# Patient Record
Sex: FEMALE | Race: WHITE
[De-identification: names, ages, dates, MRNs, and addresses within clinical notes are randomized per-mention and may not be internally consistent; named-entity substitution may affect disease eponyms.]

---

## 2019-10-07 ENCOUNTER — HOSPITAL ENCOUNTER (EMERGENCY)
Dept: HOSPITAL 25 - UCEAST | Age: 24
Discharge: HOME | End: 2019-10-07
Payer: COMMERCIAL

## 2019-10-07 VITALS — DIASTOLIC BLOOD PRESSURE: 86 MMHG | SYSTOLIC BLOOD PRESSURE: 145 MMHG

## 2019-10-07 DIAGNOSIS — R11.0: ICD-10-CM

## 2019-10-07 DIAGNOSIS — R10.13: Primary | ICD-10-CM

## 2019-10-07 PROCEDURE — 84702 CHORIONIC GONADOTROPIN TEST: CPT

## 2019-10-07 PROCEDURE — G0463 HOSPITAL OUTPT CLINIC VISIT: HCPCS

## 2019-10-07 PROCEDURE — 81003 URINALYSIS AUTO W/O SCOPE: CPT

## 2019-10-07 PROCEDURE — 99202 OFFICE O/P NEW SF 15 MIN: CPT

## 2019-10-07 NOTE — UC
Abdominal Pain Female HPI





- HPI Summary


HPI Summary: 


Patient is a 25yo female presenting with abdominal "aching" x4 days. She states 

it is worse in the morning and comes with nausea. Says these are common 

symptoms that are present on the first day of her menstrual period which was on 

10/4/19. She says her cramps are never this strong and never last this long. 

She also notes the cramping is normally lower that the upper abdominal pain she 

is experiencing now. She rates the pain 5/10. Denies radiating pain. States 

pain has not worsened or improved. Notes decreased appetite which is also 

common for her during menstrual period. Denies decreased fluid intake. States 

she feels she has been "breathing more" but is not short of breath. Denies any 

aggravating or alleviating factors. Denies vomiting and diarrhea. Denies blood 

in stool or urine. Denies urinary burning or frequency. Denies any chance of 

pregnancy. Denies fever and chills. Denies ay history of GERD or other GI 

disorders. Denies daily medications. Notes she does get anxiety occasionally. 

Patient adds she is also worried she "may be anemic or have something wrong in 

her blood." Denies history of anemia.








- History of Current Complaint


Stated Complaint: ABDOMINAL PAIN


Hx Obtained From: Patient


Hx Last Menstrual Period: 10/4/19


Severity Currently: Moderate


Pain Intensity: 5


Pain Scale Used: 0-10 Numeric


Allergies/Adverse Reactions: 


 Allergies











Allergy/AdvReac Type Severity Reaction Status Date / Time


 


No Known Allergies Allergy   Verified 10/07/19 12:11














PMH/Surg Hx/FS Hx/Imm Hx


Previously Healthy: Yes





- Surgical History


Surgical History: None





- Family History


Known Family History: Positive: Unknown





- Social History


Alcohol Use: Rare


Substance Use Type: None


Smoking Status (MU): Never Smoked Tobacco





Review of Systems


All Other Systems Reviewed And Are Negative: Yes


Constitutional: Positive: Negative.  Negative: Fever, Chills, Fatigue


Skin: Positive: Negative


Eyes: Positive: Negative


ENT: Positive: Negative


Respiratory: Positive: Negative.  Negative: Shortness Of Breath, Cough


Cardiovascular: Positive: Negative.  Negative: Palpitations, Chest Pain


Gastrointestinal: Positive: Abdominal Pain, Nausea.  Negative: Vomiting, 

Diarrhea


Genitourinary: Positive: Negative


Motor: Positive: Negative


Musculoskeletal: Positive: Negative


Neurological: Positive: Negative


Psychological: Positive: Anxious





Physical Exam


Triage Information Reviewed: Yes


Appearance: Well-Appearing, No Pain Distress, Well-Nourished, Other: - Patient 

fidgeting in chair and avoiding eye contact


Vital Signs: 


 Initial Vital Signs











Temp  99.3 F   10/07/19 12:06


 


Pulse  103   10/07/19 12:06


 


Resp  18   10/07/19 12:06


 


BP  145/86   10/07/19 12:06


 


Pulse Ox  100   10/07/19 12:06








 Lab Results











  





  


 


  


 


  


 


  


 


  


 


  


 


  


 


  


 


  


 


  


 


  


 


  


 


  








 Lab Results











  10/07/19 10/07/19 Range/Units





  13:01 13:04 


 


POC Urine Color  Red A   


 


POC Urine Clarity  Cloudy   


 


POC Urine pH  6.5   (5-9)  


 


POC Ur Specif Gravity  1.010   (1.010-1.030)  


 


POC Urine Protein  1+ A   (Negative)  


 


POC Ur Glucose (UA)  Negative   (Negative)  


 


POC Urine Ketones  Negative   (Negative)  


 


POC Urine Blood  3+ A   (Negative)  


 


POC Urine Nitrite  Negative   (Negative)  


 


POC Urine Bilirubin  Negative   (Negative)  


 


POC Urine Urobilinogen  0.2   (Negative)  


 


POC U Leukocyte Esteras  Negative   (Negative)  


 


POC Ur Pregnancy Test   Negative  (Negative)  











Vital Signs Reviewed: Yes


Eyes: Positive: Conjunctiva Clear


ENT: Positive: Hearing grossly normal


Neck: Positive: Supple


Respiratory Exam: Normal


Respiratory: Positive: Lungs clear, Normal breath sounds, No respiratory 

distress, No accessory muscle use.  Negative: Crackles, Rhonchi, Stridor, 

Wheezing


Cardiovascular Exam: Normal


Cardiovascular: Positive: RRR, Tachycardia


Abdominal Exam: Normal


Abdomen Description: Positive: No Organomegaly, Soft.  Negative: Nontender - 

mild tenderness to palpation over umbilicus. negative psoas and rovsings, CVA 

Tenderness (R), CVA Tenderness (L), Distended, Guarding, Hepatomegaly, McBurney'

s Point Tenderness, Splenomegaly


Bowel Sounds: Positive: Present


Neurological: Positive: Alert


Psychological Exam: Other - Patient appears anxious





Abd Pain Female Course/Dx





- Course


Course Of Treatment: 


Discussed with patient that her symptoms are most likely caused by her 

menstrual cycle, anxiety, or a combination of the two. Her VS are normal and 

she is in no pain distress. UA was positive only for blood, which she states is 

menstrual blood. No urinary symptoms present and urine preg is negative. 

Patient given prescription for zofran for her nausea, as that what she states 

is bothering her the most. Patient concerned for something "wrong in her blood.

" When offered blood work, she refused today. Instructed her to follow up with 

the physician referral if she desires further work up or if symptoms persist. 

Instructed her to go to the ED if symptoms worsen. Patient voiced understanding 

and agreed to the treatment plan.








- Differential Dx/Diagnosis


Provider Diagnosis: 


 Nausea, Abdominal pain, acute, epigastric








Discharge ED





- Sign-Out/Discharge


Documenting (check all that apply): Patient Departure


All imaging exams completed and their final reports reviewed: No Studies





- Discharge Plan


Condition: Stable


Disposition: HOME


Prescriptions: 


Ondansetron ODT TAB* [Zofran 4 MG Odt TAB*] 4 mg PO Q6H PRN #12 tab.odt


 PRN Reason: Nausea


Patient Education Materials:  Acute Nausea and Vomiting (ED), Acute Abdominal 

Pain (ED)


Referrals: 


[facility] [Outside] - If Needed


[facility] PHYSICIAN REFERRAL [Outside] - If Needed


Additional Instructions: 


As discussed, take the zofran as prescribed for your nausea.


Get plenty of rest and fluids.


Eat a bland diet, such as bread, bananas, and rice while symptoms are present.


If your symptoms persist or you desire further workup, follow up with the [facility] or Physician Referral as listed below.


If you develop fever, excessive vomiting, nausea, diarrhea, or are unable to 

keep fluids down, go to the emergency department.








- Billing Disposition and Condition


Condition: STABLE


Disposition: Home

## 2019-10-08 ENCOUNTER — HOSPITAL ENCOUNTER (EMERGENCY)
Dept: HOSPITAL 25 - ED | Age: 24
Discharge: HOME | End: 2019-10-08
Payer: COMMERCIAL

## 2019-10-08 VITALS — SYSTOLIC BLOOD PRESSURE: 129 MMHG | DIASTOLIC BLOOD PRESSURE: 78 MMHG

## 2019-10-08 DIAGNOSIS — R10.13: Primary | ICD-10-CM

## 2019-10-08 LAB
ALBUMIN SERPL BCG-MCNC: 4.6 G/DL (ref 3.2–5.2)
ALBUMIN/GLOB SERPL: 2 {RATIO} (ref 1–3)
ALP SERPL-CCNC: 41 U/L (ref 34–104)
ALT SERPL W P-5'-P-CCNC: 13 U/L (ref 7–52)
ANION GAP SERPL CALC-SCNC: 6 MMOL/L (ref 2–11)
AST SERPL-CCNC: 19 U/L (ref 13–39)
BASOPHILS # BLD AUTO: 0 10^3/UL (ref 0–0.2)
BUN SERPL-MCNC: 5 MG/DL (ref 6–24)
BUN/CREAT SERPL: 7.1 (ref 8–20)
CALCIUM SERPL-MCNC: 9.4 MG/DL (ref 8.6–10.3)
CHLORIDE SERPL-SCNC: 108 MMOL/L (ref 101–111)
CK SERPL-CCNC: 98 U/L (ref 10–223)
EOSINOPHIL # BLD AUTO: 0 10^3/UL (ref 0–0.6)
GLOBULIN SER CALC-MCNC: 2.3 G/DL (ref 2–4)
GLUCOSE SERPL-MCNC: 124 MG/DL (ref 70–100)
HCG SERPL QL: < 0.6 MIU/ML
HCO3 SERPL-SCNC: 27 MMOL/L (ref 22–32)
HCT VFR BLD AUTO: 39 % (ref 35–47)
HGB BLD-MCNC: 13.5 G/DL (ref 12–16)
LYMPHOCYTES # BLD AUTO: 1.2 10^3/UL (ref 1–4.8)
MAGNESIUM SERPL-MCNC: 1.9 MG/DL (ref 1.9–2.7)
MCH RBC QN AUTO: 31 PG (ref 27–31)
MCHC RBC AUTO-ENTMCNC: 34 G/DL (ref 31–36)
MCV RBC AUTO: 89 FL (ref 80–97)
MONOCYTES # BLD AUTO: 0.2 10^3/UL (ref 0–0.8)
NEUTROPHILS # BLD AUTO: 3 10^3/UL (ref 1.5–7.7)
NRBC # BLD AUTO: 0 10^3/UL
NRBC BLD QL AUTO: 0
PLATELET # BLD AUTO: 180 10^3/UL (ref 150–450)
POTASSIUM SERPL-SCNC: 3.5 MMOL/L (ref 3.5–5)
PROT SERPL-MCNC: 6.9 G/DL (ref 6.4–8.9)
RBC # BLD AUTO: 4.4 10^6 /UL (ref 3.7–4.87)
RBC UR QL AUTO: (no result)
SODIUM SERPL-SCNC: 141 MMOL/L (ref 135–145)
WBC # BLD AUTO: 4.4 10^3/UL (ref 3.5–10.8)
WBC UR QL AUTO: (no result)

## 2019-10-08 PROCEDURE — 83605 ASSAY OF LACTIC ACID: CPT

## 2019-10-08 PROCEDURE — 81003 URINALYSIS AUTO W/O SCOPE: CPT

## 2019-10-08 PROCEDURE — 36415 COLL VENOUS BLD VENIPUNCTURE: CPT

## 2019-10-08 PROCEDURE — 87086 URINE CULTURE/COLONY COUNT: CPT

## 2019-10-08 PROCEDURE — 83690 ASSAY OF LIPASE: CPT

## 2019-10-08 PROCEDURE — 80053 COMPREHEN METABOLIC PANEL: CPT

## 2019-10-08 PROCEDURE — 96360 HYDRATION IV INFUSION INIT: CPT

## 2019-10-08 PROCEDURE — 83735 ASSAY OF MAGNESIUM: CPT

## 2019-10-08 PROCEDURE — 81015 MICROSCOPIC EXAM OF URINE: CPT

## 2019-10-08 PROCEDURE — 74019 RADEX ABDOMEN 2 VIEWS: CPT

## 2019-10-08 PROCEDURE — 99283 EMERGENCY DEPT VISIT LOW MDM: CPT

## 2019-10-08 PROCEDURE — 85025 COMPLETE CBC W/AUTO DIFF WBC: CPT

## 2019-10-08 PROCEDURE — 82550 ASSAY OF CK (CPK): CPT

## 2019-10-08 PROCEDURE — 84702 CHORIONIC GONADOTROPIN TEST: CPT

## 2019-10-08 PROCEDURE — 86140 C-REACTIVE PROTEIN: CPT

## 2019-10-08 NOTE — ED
Abdominal Pain/Female





- HPI Summary


HPI Summary: 


The patient is a 23 y/o F presenting to [facility] with a chief complaint of upper 

epigastric pain starting four days ago. She reports that since onset, the pain 

has been coming when she is sleeping, causing her to wake up, and then she is 

unable to fall back asleep as the pain continues. Throughout the day, the pain 

dissipates, but returns when she is about to go to sleep. The pain is described 

as a cramping and knotting sensation in the upper abdominal area. She 

additionally c/o nausea without vomiting and a decreased appetite. She denies 

any diarrhea or constipation. Currently, her symptoms are rated 7/10 in 

severity. There are no aggravating or alleviating factors. She has not taken 

any medications to treat the pain prior to arrival. LNMP: 10/04/19. PMHx: none. 

FHx: DM. Nonsmoker, rare EtOH, no substance use. Medications reviewed. 

Allergies noted.





- History of Current Complaint


Chief Complaint: EDAbdPain


Stated Complaint: ABD PAIN PER PT


Time Seen by Provider: 10/08/19 07:46


Hx Obtained From: Patient


Hx Last Menstrual Period: 10/4/19


Onset/Duration: Lasting Days - four, Still Present


Timing: Days


Severity Initially: Mild


Severity Currently: Moderate


Pain Intensity: 7


Pain Scale Used: 0-10 Numeric


Location: Epigastric


Radiates: No


Character: Cramping, Other: - "knotting"


Aggravating Factor(s): Nothing


Alleviating Factor(s): Nothing


Associated Signs and Symptoms: Positive: Decreased Appetite, Nausea, Other: - 

sleep disturbance.  Negative: Constipation, Vomiting, Diarrhea


Allergies/Adverse Reactions: 


 Allergies











Allergy/AdvReac Type Severity Reaction Status Date / Time


 


No Known Allergies Allergy   Verified 10/07/19 12:11














PMH/Surg Hx/FS Hx/Imm Hx


Endocrine/Hematology History: 


   Denies: Hx Diabetes, Hx Thyroid Disease


Cardiovascular History: 


   Denies: Hx Hypercholesterolemia, Hx Hypertension


Respiratory History: 


   Denies: Hx Asthma, Hx Chronic Obstructive Pulmonary Disease (COPD)


GI History: 


   Denies: Hx Ulcer





- Surgical History


Surgical History: None


Surgery Procedure, Year, and Place: none


Infectious Disease History: No


Infectious Disease History: 


   Denies: Hx Hepatitis, Hx Human Immunodeficiency Virus (HIV), Traveled 

Outside the [place] in Last 30 Days





- Family History


Known Family History: Positive: Diabetes





- Social History


Alcohol Use: Rare


Hx Substance Use: No


Substance Use Type: Reports: None


Hx Tobacco Use: No


Smoking Status (MU): Never Smoked Tobacco





Review of Systems


Positive: Other - sleep disturbance secondary to pain


Positive: Abdominal Pain - upper/epigastric, cramping "knotted", Nausea.  

Negative: Vomiting, Diarrhea, Other - constipation, decreased appetite


All Other Systems Reviewed And Are Negative: Yes





Physical Exam





- Summary


Physical Exam Summary: 


VITAL SIGNS: Reviewed. 


GENERAL: Patient is a well-developed and nourished female who is lying 

comfortable in the stretcher. Patient is not in any acute respiratory distress. 


HEAD AND FACE: Normocephalic and atraumatic. 


EYES: PERRLA, EOMI x 2, No injected conjunctiva.


EARS: Hearing grossly intact. Ear canals and tympanic membranes are WNL.


MOUTH: Oropharynx within normal limits. 


NECK: Supple, trachea is midline, no adenopathy, no JVD.


CHEST: Symmetric, no tenderness at palpation. 


LUNGS: Clear to auscultation bilaterally. No wheezing or crackles.


CVS: RRR, S1 and S2 present, no murmurs or gallops appreciated. 


ABDOMEN: Soft, mild epigastric tenderness. No signs of distention. Positive 

bowel sounds. No rebound, no guarding, and no masses palpated. No abdominal 

bruit or pulsations. 


EXTREMITIES: FROM in all major joints, no edema, no cyanosis or clubbing.


NEURO: Alert and oriented x 3. No acute neurological deficits. Speech is normal.


SKIN: Dry and warm.


Triage Information Reviewed: Yes


Vital Signs On Initial Exam: 


 Initial Vitals











Temp Pulse Resp BP Pulse Ox


 


 97.2 F   109   18   123/80   100 


 


 10/08/19 07:38  10/08/19 07:38  10/08/19 07:38  10/08/19 07:38  10/08/19 07:38











Vital Signs Reviewed: Yes





Procedures





- Sedation


Patient Received Moderate/Deep Sedation with Procedure: No





Diagnostics





- Vital Signs


 Vital Signs











  Temp Pulse Resp BP Pulse Ox


 


 10/08/19 07:38  97.2 F  109  18  123/80  100














- Laboratory


Result Diagrams: 


 10/08/19 08:10





 10/08/19 08:10


Lab Statement: Any lab studies that have been ordered have been reviewed, and 

results considered in the medical decision making process.





- Radiology


  ** Abdomen X-Ray


Radiology Interpretation Completed By: Radiologist


Summary of Radiographic Findings: Impression: Nonobstructive bowel gas pattern. 

ED physician has reviewed this report.





Re-Evaluation





- Re-Evaluation


  ** First Eval


Re-Evaluation Time: 10:44


Change: Improved


Comment: Patient's pain has improved in the ED. We discussed all results and 

plan for discharge.





Abdominal Pain Fem Course/Dx





- Course


Course Of Treatment: Patient is a 23 y/o F with chief complanint of epigastric 

cramping and nausea without vomiting, diarrhea, or constipation onset four days 

ago when she started her last menstrual cycle. Blood work without any 

significant abnormality except for glucose of 124 and total bili of 1.2. 

Urinalysis shows 3+ blood possibly secondary to her menstrual cycle. X-ray of 

the abdomen impression: non-obstructive bowel gas pattern. In the ED course the 

patient was given a GI cocktail, and her symptoms significantly improved. At 

this time, the patient is asymptomatic. On the abdominal exam, there is no 

tenderness. Therefore, I believe that the patient doesnt need to do any other 

images, and she declined a pelvic exam. I discussed all the findings and test 

results with the patient. Patient was instructed to return to the emergency 

room immediately if any of the symptoms return worsens. Plan of care was 

discussed with the patient and understands and agrees. All questions were 

answered at patient satisfaction. There were no further complaints or concerns. 

Lung exam before discharge: CTA B/L. Good air exchange. No wheezing or crackles 

heard. CVS: S1 and S2 present. No murmurs appreciated. Patient is alert and 

oriented x 3. Patient is hemodynamically stable. Patient will be discharged 

home with follow up PCP in the next 2-3 days.





- Diagnoses


Provider Diagnoses: 


 Epigastric abdominal pain








Discharge ED





- Sign-Out/Discharge


Documenting (check all that apply): Patient Departure - Patient will be 

discharged home.





- Discharge Plan


Condition: Improved


Disposition: HOME


Prescriptions: 


Omeprazole CAP (NF) [Prilosec CAP* 20 MG] 20 mg PO BID #20 cap.


Patient Education Materials:  Epigastric Pain (ED)


Referrals: 


[facility] [Outside] - 3 Days


[facility] [Primary Care Provider] - 3 Days


Additional Instructions: 


Follow up with your primary care provider in 2-3 days. 





Return to the emergency department for any new or worsening symptoms.





- Billing Disposition and Condition


Condition: IMPROVED


Disposition: Home





- Attestation Statements


Document Initiated by Scribe: Yes


Documenting Scribe: Pam Lozada


Provider For Whom Meme is Documenting (Include Credential): Dr. José Manuel Lakhani MD


Scribe Attestation: 


IPam scribed for Dr. José Manuel Lakhani MD on 10/08/19 at 1854. 


Scribe Documentation Reviewed: Yes


Provider Attestation: 


The documentation as recorded by the Pam paulson accurately reflects 

the service I personally performed and the decisions made by me, Dr. José Manuel Lakhani MD


Status of Scribe Document: Viewed